# Patient Record
Sex: FEMALE | Race: WHITE | Employment: FULL TIME | URBAN - METROPOLITAN AREA
[De-identification: names, ages, dates, MRNs, and addresses within clinical notes are randomized per-mention and may not be internally consistent; named-entity substitution may affect disease eponyms.]

---

## 2019-05-30 ENCOUNTER — HOSPITAL ENCOUNTER (OUTPATIENT)
Age: 59
Discharge: HOME | End: 2019-06-01
Payer: COMMERCIAL

## 2019-05-30 PROCEDURE — 87624 HPV HI-RISK TYP POOLED RSLT: CPT

## 2019-05-30 PROCEDURE — G0123 SCREEN CERV/VAG THIN LAYER: HCPCS

## 2019-06-06 LAB
CORRESPONDING PAP CASE #: NORMAL
HPV, HIGH RISK: NEGATIVE

## 2020-08-06 ENCOUNTER — HOSPITAL ENCOUNTER (OUTPATIENT)
Dept: MAMMOGRAPHY | Age: 60
Discharge: HOME | End: 2020-08-08
Payer: COMMERCIAL

## 2020-08-06 PROCEDURE — 77063 BREAST TOMOSYNTHESIS BI: CPT

## 2021-09-07 ENCOUNTER — HOSPITAL ENCOUNTER (OUTPATIENT)
Dept: MAMMOGRAPHY | Age: 61
Discharge: HOME | End: 2021-09-09
Payer: COMMERCIAL

## 2021-09-07 DIAGNOSIS — Z12.31 ENCOUNTER FOR SCREENING MAMMOGRAM FOR MALIGNANT NEOPLASM OF BREAST: ICD-10-CM

## 2021-09-07 PROCEDURE — 77063 BREAST TOMOSYNTHESIS BI: CPT

## 2022-11-09 ENCOUNTER — HOSPITAL ENCOUNTER (OUTPATIENT)
Dept: MAMMOGRAPHY | Age: 62
Discharge: HOME | End: 2022-11-11
Payer: COMMERCIAL

## 2022-11-09 DIAGNOSIS — Z12.31 SCREENING MAMMOGRAM FOR BREAST CANCER: ICD-10-CM

## 2022-11-09 PROCEDURE — 77063 BREAST TOMOSYNTHESIS BI: CPT

## 2023-05-22 ENCOUNTER — HOSPITAL ENCOUNTER (OUTPATIENT)
Dept: GENERAL RADIOLOGY | Age: 63
Discharge: HOME | End: 2023-05-24
Payer: COMMERCIAL

## 2023-05-22 ENCOUNTER — HOSPITAL ENCOUNTER (OUTPATIENT)
Age: 63
Discharge: HOME | End: 2023-05-24
Payer: COMMERCIAL

## 2023-05-22 DIAGNOSIS — M79.644 BILATERAL THUMB PAIN: ICD-10-CM

## 2023-05-22 DIAGNOSIS — M79.645 BILATERAL THUMB PAIN: ICD-10-CM

## 2023-05-22 DIAGNOSIS — S61.242A: ICD-10-CM

## 2023-05-22 DIAGNOSIS — L03.011 CELLULITIS OF RIGHT THUMB: ICD-10-CM

## 2023-05-22 PROCEDURE — 73130 X-RAY EXAM OF HAND: CPT

## 2023-12-11 ENCOUNTER — HOSPITAL ENCOUNTER (OUTPATIENT)
Dept: MAMMOGRAPHY | Age: 63
Discharge: HOME | End: 2023-12-13
Payer: COMMERCIAL

## 2023-12-11 VITALS — BODY MASS INDEX: 22.15 KG/M2 | HEIGHT: 63 IN | WEIGHT: 125 LBS

## 2023-12-11 DIAGNOSIS — Z12.31 ENCOUNTER FOR SCREENING MAMMOGRAM FOR MALIGNANT NEOPLASM OF BREAST: ICD-10-CM

## 2023-12-11 PROCEDURE — 77063 BREAST TOMOSYNTHESIS BI: CPT

## 2024-03-27 ENCOUNTER — HOSPITAL ENCOUNTER (OUTPATIENT)
Dept: GENERAL RADIOLOGY | Age: 64
Discharge: HOME | End: 2024-03-29
Payer: COMMERCIAL

## 2024-03-27 ENCOUNTER — HOSPITAL ENCOUNTER (OUTPATIENT)
Dept: GENERAL RADIOLOGY | Age: 64
End: 2024-03-27
Payer: COMMERCIAL

## 2024-03-27 DIAGNOSIS — N64.4 BREAST PAIN, RIGHT: ICD-10-CM

## 2024-03-27 PROCEDURE — 76642 ULTRASOUND BREAST LIMITED: CPT

## 2024-04-18 NOTE — PROGRESS NOTES
physician's attention.  Continue healthy diet and exercise routinely as tolerated.    Avoid alcohol or limit to 3 drinks or less per week.    Wear a good support bra at all times.  Repeat mammogram December 2024.  Continue follow up with Primary Care/Gynecology and all specialties as directed.  Bilateral complete breast ultrasounds to be done at this time.  RTC 6 months with bilateral screening mammogram same day and as needed.    I spent a total of 50 minutes on the date of the service which included preparing to see the patient, face-to-face patient care, completing clinical documentation, obtaining and/or reviewing separately obtained history, performing a medically appropriate examination, counseling and educating the patient/family/caregiver, ordering medications, tests, or procedures, communicating with other HCPs (not separately reported), independently interpreting results (not separately reported), communicating results to the patient/family/caregiver and care coordination (not separately reported).  In addition, all questions were answered to her apparent satisfaction.    *We reviewed her breast imaging today for educational (not interpretive) purposes on this visit.    We discussed breast anatomy, breast density as assigned by radiology, and the bi-rads result.  It was clearly stated to her that interpretation of imaging and the final result of imaging is at the discretion of the reading radiologist.       This document is generated, in part, by voice recognition software and thus syntax and grammatical errors are possible.    Meagan Manzo (Terrie), RN, MSN, APRN-CNP, AOCNP  Advanced Oncology Certified Nurse Practitioner  Department of Breast Surgery  [facility]/  [facility] in collaboration with Dr. Dominga Spicer/Dr. Florencia Pillai/Dr. Valentin Manzo APRN-CNP        This document is generated, in part, by voice recognition software and thus syntax

## 2024-04-22 ASSESSMENT — ENCOUNTER SYMPTOMS
ABDOMINAL PAIN: 0
WHEEZING: 0
CHOKING: 0
COUGH: 0
ABDOMINAL DISTENTION: 0
CHEST TIGHTNESS: 0
NAUSEA: 0
VOMITING: 0
SHORTNESS OF BREATH: 0
DIARRHEA: 0
BACK PAIN: 0
CONSTIPATION: 0
ANAL BLEEDING: 0
RESPIRATORY NEGATIVE: 1

## 2024-04-24 ENCOUNTER — OFFICE VISIT (OUTPATIENT)
Dept: BREAST CENTER | Age: 64
End: 2024-04-24
Payer: COMMERCIAL

## 2024-04-24 VITALS
HEIGHT: 63 IN | WEIGHT: 128 LBS | DIASTOLIC BLOOD PRESSURE: 70 MMHG | BODY MASS INDEX: 22.68 KG/M2 | OXYGEN SATURATION: 100 % | SYSTOLIC BLOOD PRESSURE: 138 MMHG | TEMPERATURE: 99 F | HEART RATE: 60 BPM | RESPIRATION RATE: 14 BRPM

## 2024-04-24 DIAGNOSIS — R92.30 DENSE BREAST TISSUE: ICD-10-CM

## 2024-04-24 DIAGNOSIS — N64.4 BREAST PAIN: Primary | ICD-10-CM

## 2024-04-24 DIAGNOSIS — Z12.31 ENCOUNTER FOR SCREENING MAMMOGRAM FOR BREAST CANCER: ICD-10-CM

## 2024-04-24 DIAGNOSIS — E78.49 OTHER HYPERLIPIDEMIA: ICD-10-CM

## 2024-04-24 PROCEDURE — G8427 DOCREV CUR MEDS BY ELIG CLIN: HCPCS | Performed by: NURSE PRACTITIONER

## 2024-04-24 PROCEDURE — G8420 CALC BMI NORM PARAMETERS: HCPCS | Performed by: NURSE PRACTITIONER

## 2024-04-24 PROCEDURE — 3017F COLORECTAL CA SCREEN DOC REV: CPT | Performed by: NURSE PRACTITIONER

## 2024-04-24 PROCEDURE — 1036F TOBACCO NON-USER: CPT | Performed by: NURSE PRACTITIONER

## 2024-04-24 PROCEDURE — 99203 OFFICE O/P NEW LOW 30 MIN: CPT | Performed by: NURSE PRACTITIONER

## 2024-04-24 PROCEDURE — 99204 OFFICE O/P NEW MOD 45 MIN: CPT | Performed by: NURSE PRACTITIONER

## 2024-04-24 RX ORDER — ESTRADIOL 0.1 MG/G
CREAM VAGINAL
COMMUNITY

## 2024-04-24 NOTE — PATIENT INSTRUCTIONS
We will be in touch to schedule your bilateral whole breast ultrasounds.    According to the American College of Radiology (ACR), consideration should be made for bilateral complete breast screening ultrasounds in women who have been noted to have dense breast tissue on imaging.     After reviewing her mammogram and performing clinical breast exam, we recommend, based on her breast tissue density score (grade C) that she consider bilateral complete breast screening ultrasounds annually.  She was advised to contact her insurance provider prior to having breast ultrasounds to ensure out-of-pocket costs are acceptable.  We briefly discussed that there are conflicting reports on the value of ultrasound screening for women with breast dense tissue and that this exam is optional.  Whole breast ultrasounds are thought to increase cancer detection rates to 1.8 to 4.6% when used in conjunction with screening mammogram.  It should be noted that the [facility] is a designated Center of Excellence for breast care.

## 2024-08-07 ENCOUNTER — HOSPITAL ENCOUNTER (OUTPATIENT)
Dept: GENERAL RADIOLOGY | Age: 64
Discharge: HOME | End: 2024-08-09
Payer: COMMERCIAL

## 2024-08-07 DIAGNOSIS — R92.30 DENSE BREAST TISSUE: ICD-10-CM

## 2024-08-07 PROCEDURE — 76641 ULTRASOUND BREAST COMPLETE: CPT

## 2024-11-27 NOTE — PROGRESS NOTES
This note was copied forward from the last encounter.  Essential components for this patient record were reviewed and verified on this visit including:  recent hospitalizations, recent imaging, PMH, PSH, FH, SOC HX, Allergies, and Medications were reviewed and updated as appropriate.  In addition, the assessment and plan were copied from prior office note and updated accordingly.     Medical Breast Clinic  [facility]    PATIENT NAME: Emerita Lewis  MRN: [id number]    REFERRAL:  Lori Simental CNP    REASON FOR VISIT:   Complaints of right breast lump and mastodynia.    HISTORY of PRESENT ILLNESS:  Emerita Lewis is a bora 64 y.o. year old female who presents to the [facility] medical breast clinic today for evaluation of a change in her right breast with focal discomfort.  Risks for breast cancer include age and gender only.  She has no family history of breast or ovarian cancer.  Menarche age 16.  Menopause age 46.  G1.  P1.  Age of first live birth 26.  She did not breast-feed.  She reports she has used topical hormonal therapy for approximately 1 year.  Denies history of prior thoracic radiation.  Denies Ashkenazi [employer]ism ancestry.  Remote history of tobacco use; quit 12 years ago.    BREAST IMAGING TO DATE:  JOANNA WICK IBS RISK SCORE 6%.  GRADE C BREAST TISSUE DENSITY  11/09/2022 bilateral screening mammogram: Negative, BI-RADS 1.  12/11/2023 bilateral screening mammogram: Negative, BI-RADS 1.  03/27/2024 right diagnostic ultrasound: Negative, BI-RADS 1.    PERSONAL BREAST HISTORY: Denies    PERTINENT FAMILY HISTORY:  Dad with lung cancer  Sister with lupus then lung cancer.  Other Cancer:  There is no family history of prostate, colon, uterine, pancreatic, gastric, brain, renal cell or thyroid cancer. There is no family history of melanoma, sarcoma or leukemia.      Review of Systems   Constitutional:  Negative for activity change, appetite change, fatigue and fever.        Kalyn continues to.  She

## 2024-12-12 ENCOUNTER — OFFICE VISIT (OUTPATIENT)
Dept: BREAST CENTER | Age: 64
End: 2024-12-12
Payer: COMMERCIAL

## 2024-12-12 ENCOUNTER — HOSPITAL ENCOUNTER (OUTPATIENT)
Dept: GENERAL RADIOLOGY | Age: 64
Discharge: HOME | End: 2024-12-14
Payer: COMMERCIAL

## 2024-12-12 VITALS — WEIGHT: 117 LBS | BODY MASS INDEX: 20.73 KG/M2 | HEIGHT: 63 IN

## 2024-12-12 VITALS
HEART RATE: 59 BPM | TEMPERATURE: 98 F | DIASTOLIC BLOOD PRESSURE: 68 MMHG | SYSTOLIC BLOOD PRESSURE: 134 MMHG | WEIGHT: 120 LBS | OXYGEN SATURATION: 99 % | BODY MASS INDEX: 21.26 KG/M2 | HEIGHT: 63 IN

## 2024-12-12 DIAGNOSIS — R92.30 DENSE BREAST TISSUE: Primary | ICD-10-CM

## 2024-12-12 DIAGNOSIS — Z12.31 ENCOUNTER FOR SCREENING MAMMOGRAM FOR BREAST CANCER: ICD-10-CM

## 2024-12-12 PROCEDURE — G8420 CALC BMI NORM PARAMETERS: HCPCS | Performed by: NURSE PRACTITIONER

## 2024-12-12 PROCEDURE — 3017F COLORECTAL CA SCREEN DOC REV: CPT | Performed by: NURSE PRACTITIONER

## 2024-12-12 PROCEDURE — G8427 DOCREV CUR MEDS BY ELIG CLIN: HCPCS | Performed by: NURSE PRACTITIONER

## 2024-12-12 PROCEDURE — 77063 BREAST TOMOSYNTHESIS BI: CPT

## 2024-12-12 PROCEDURE — 99213 OFFICE O/P EST LOW 20 MIN: CPT | Performed by: NURSE PRACTITIONER

## 2024-12-12 PROCEDURE — G8484 FLU IMMUNIZE NO ADMIN: HCPCS | Performed by: NURSE PRACTITIONER

## 2024-12-12 PROCEDURE — 1036F TOBACCO NON-USER: CPT | Performed by: NURSE PRACTITIONER

## 2024-12-12 RX ORDER — FAMOTIDINE 40 MG/1
TABLET, FILM COATED ORAL
COMMUNITY
Start: 2024-12-04

## 2024-12-12 ASSESSMENT — ENCOUNTER SYMPTOMS
GASTROINTESTINAL NEGATIVE: 1
BLOOD IN STOOL: 0